# Patient Record
Sex: MALE | URBAN - METROPOLITAN AREA
[De-identification: names, ages, dates, MRNs, and addresses within clinical notes are randomized per-mention and may not be internally consistent; named-entity substitution may affect disease eponyms.]

---

## 2019-03-19 ENCOUNTER — OFFICE VISIT (OUTPATIENT)
Dept: URBAN - METROPOLITAN AREA CLINIC 17 | Facility: CLINIC | Age: 49
End: 2019-03-19
Payer: COMMERCIAL

## 2019-03-19 DIAGNOSIS — H16.322 DIFFUSE INTERSTITIAL KERATITIS, LEFT EYE: Primary | ICD-10-CM

## 2019-03-19 PROCEDURE — 99203 OFFICE O/P NEW LOW 30 MIN: CPT | Performed by: OPTOMETRIST

## 2019-03-19 RX ORDER — TOBRAMYCIN AND DEXAMETHASONE 3; 1 MG/ML; MG/ML
SUSPENSION/ DROPS OPHTHALMIC
Qty: 1 | Refills: 1 | Status: ACTIVE
Start: 2019-03-19

## 2019-03-19 ASSESSMENT — INTRAOCULAR PRESSURE
OD: 14
OD: 21
OS: 24
OS: 17

## 2019-03-19 NOTE — IMPRESSION/PLAN
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 Plan: Discussed diagnosis in detail with patient. Reassured patient of current condition and treatment. Will continue to observe condition and or symptoms. Erx Tobradex drops to [facility] pharmacy pt will use 4 times daily into [facility].